# Patient Record
Sex: FEMALE | URBAN - METROPOLITAN AREA
[De-identification: names, ages, dates, MRNs, and addresses within clinical notes are randomized per-mention and may not be internally consistent; named-entity substitution may affect disease eponyms.]

---

## 2021-02-17 ENCOUNTER — APPOINTMENT (RX ONLY)
Dept: URBAN - METROPOLITAN AREA CLINIC 23 | Facility: CLINIC | Age: 31
Setting detail: DERMATOLOGY
End: 2021-02-17

## 2021-02-17 VITALS — TEMPERATURE: 96.7 F

## 2021-02-17 DIAGNOSIS — L82.1 OTHER SEBORRHEIC KERATOSIS: ICD-10-CM

## 2021-02-17 DIAGNOSIS — L91.8 OTHER HYPERTROPHIC DISORDERS OF THE SKIN: ICD-10-CM

## 2021-02-17 PROCEDURE — ? DEFER

## 2021-02-17 PROCEDURE — ? SKIN TAG REMOVAL

## 2021-02-17 PROCEDURE — 11200 RMVL SKIN TAGS UP TO&INC 15: CPT

## 2021-02-17 PROCEDURE — 99202 OFFICE O/P NEW SF 15 MIN: CPT | Mod: 25

## 2021-02-17 PROCEDURE — ? COUNSELING

## 2021-02-17 PROCEDURE — ? OTHER

## 2021-02-17 ASSESSMENT — LOCATION SIMPLE DESCRIPTION DERM
LOCATION SIMPLE: LEFT CHEEK
LOCATION SIMPLE: RIGHT CHEEK
LOCATION SIMPLE: NECK

## 2021-02-17 ASSESSMENT — LOCATION DETAILED DESCRIPTION DERM
LOCATION DETAILED: RIGHT SUPERIOR LATERAL NECK
LOCATION DETAILED: RIGHT CENTRAL LATERAL NECK
LOCATION DETAILED: RIGHT CENTRAL MALAR CHEEK
LOCATION DETAILED: LEFT CENTRAL MALAR CHEEK

## 2021-02-17 ASSESSMENT — LOCATION ZONE DERM
LOCATION ZONE: NECK
LOCATION ZONE: FACE

## 2021-02-17 NOTE — PROCEDURE: SKIN TAG REMOVAL
Include Z78.9 (Other Specified Conditions Influencing Health Status) As An Associated Diagnosis?: No
Consent: Written consent obtained and the risks of skin tag removal was reviewed with the patient including but not limited to bleeding, pigmentary change, infection, pain, and remote possibility of scarring.
Anesthesia Type: 1% lidocaine with 1:100,000 epinephrine and a 1:10 solution of 8.4% sodium bicarbonate
Add Associated Diagnoses If Applicable When Selecting Medical Necessity: Yes
Medical Necessity Clause: This procedure was medically necessary because the lesion that was treated was
Medical Necessity Information: It is in your best interest to select a reason for this procedure from the list below. All of these items fulfill various CMS LCD requirements except the new and changing color options.
Hemostasis: Aluminum Chloride
Detail Level: Detailed
Anesthesia Volume In Cc: 0.5

## 2021-02-17 NOTE — PROCEDURE: DEFER
Procedure To Be Performed At Next Visit: Cautery
Introduction Text (Please End With A Colon): The following procedure was deferred:
Detail Level: Detailed
Instructions (Optional): EMLA cream provided to the patient to apply 45 mins to her treatment appointment

## 2021-02-17 NOTE — PROCEDURE: OTHER
Note Text (......Xxx Chief Complaint.): This diagnosis correlates with the
Detail Level: Simple
Render Risk Assessment In Note?: no
Other (Free Text): Quoted $200 for first  treatment visit

## 2022-03-18 PROBLEM — E66.01 SEVERE OBESITY (HCC): Status: ACTIVE | Noted: 2020-11-11

## 2022-03-19 PROBLEM — M75.22 BICEPS TENDINITIS OF LEFT UPPER EXTREMITY: Status: ACTIVE | Noted: 2018-03-27

## 2022-07-26 ENCOUNTER — OFFICE VISIT (OUTPATIENT)
Dept: OCCUPATIONAL MEDICINE | Age: 32
End: 2022-07-26

## 2022-07-26 VITALS
DIASTOLIC BLOOD PRESSURE: 84 MMHG | OXYGEN SATURATION: 99 % | TEMPERATURE: 99.3 F | HEART RATE: 60 BPM | SYSTOLIC BLOOD PRESSURE: 148 MMHG | RESPIRATION RATE: 16 BRPM

## 2022-07-26 DIAGNOSIS — J02.9 SORE THROAT: Primary | ICD-10-CM

## 2022-07-26 DIAGNOSIS — R68.83 CHILLS: ICD-10-CM

## 2022-07-26 LAB
GROUP A STREP ANTIGEN, POC: NEGATIVE
SARS-COV-2, POC: NORMAL
VALID INTERNAL CONTROL, POC: YES

## 2022-07-26 PROCEDURE — 87426 SARSCOV CORONAVIRUS AG IA: CPT | Performed by: NURSE PRACTITIONER

## 2022-07-26 PROCEDURE — 99213 OFFICE O/P EST LOW 20 MIN: CPT | Performed by: NURSE PRACTITIONER

## 2022-07-26 PROCEDURE — 87880 STREP A ASSAY W/OPTIC: CPT | Performed by: NURSE PRACTITIONER

## 2022-07-26 RX ORDER — LOSARTAN POTASSIUM 25 MG/1
TABLET ORAL
COMMUNITY
Start: 2022-07-26

## 2022-07-26 ASSESSMENT — ENCOUNTER SYMPTOMS
CHANGE IN BOWEL HABIT: 0
SINUS PRESSURE: 0
COUGH: 0
EYE DISCHARGE: 0
VOMITING: 0
VOICE CHANGE: 0
EYE ITCHING: 0
RHINORRHEA: 0
TROUBLE SWALLOWING: 1
EYE PAIN: 0
VISUAL CHANGE: 0
SWOLLEN GLANDS: 0
EYE REDNESS: 0
ABDOMINAL PAIN: 0
SINUS PAIN: 0
SORE THROAT: 1
NAUSEA: 0

## 2022-07-26 NOTE — PATIENT INSTRUCTIONS
Patient Education        Sore Throat: Care Instructions  Overview     Infection by bacteria or a virus causes most sore throats. Cigarette smoke, dry air, air pollution, allergies, and yelling can also cause a sore throat. Sore throats can be painful and annoying. Fortunately, most sore throats go away on their own. If you have a bacterial infection, your doctor may prescribeantibiotics. Follow-up care is a key part of your treatment and safety. Be sure to make and go to all appointments, and call your doctor if you are having problems. It's also a good idea to know your test results and keep alist of the medicines you take. How can you care for yourself at home? If your doctor prescribed antibiotics, take them as directed. Do not stop taking them just because you feel better. You need to take the full course of antibiotics. Gargle with warm salt water several times a day to help reduce swelling and relieve pain. Mix 1/2 teaspoon of salt in 1 cup of warm water. Take an over-the-counter pain medicine, such as acetaminophen (Tylenol), ibuprofen (Advil, Motrin), or naproxen (Aleve). Read and follow all instructions on the label. Be careful when taking over-the-counter cold or flu medicines and Tylenol at the same time. Many of these medicines have acetaminophen, which is Tylenol. Read the labels to make sure that you are not taking more than the recommended dose. Too much acetaminophen (Tylenol) can be harmful. Drink plenty of fluids. Fluids may help soothe an irritated throat. Hot fluids, such as tea or soup, may help decrease throat pain. Use over-the-counter throat lozenges to soothe pain. Regular cough drops or hard candy may also help. These should not be given to young children because of the risk of choking. Do not smoke or allow others to smoke around you. If you need help quitting, talk to your doctor about stop-smoking programs and medicines. These can increase your chances of quitting for good.   Use a vaporizer or humidifier to add moisture to your bedroom. Follow the directions for cleaning the machine. When should you call for help? Call your doctor now or seek immediate medical care if:    You have trouble breathing. Your sore throat gets much worse on one side. You have new or worse trouble swallowing. You have a new or higher fever. Watch closely for changes in your health, and be sure to contact your doctor ifyou do not get better as expected. Where can you learn more? Go to https://BrandProject.Flightfox. org and sign in to your Ultora account. Enter F055 in the [facility] box to learn more about \"Sore Throat: Care Instructions. \"     If you do not have an account, please click on the \"Sign Up Now\" link. Current as of: September 8, 2021               Content Version: 13.3  © 2923-5435 [employer]. Care instructions adapted under license by [facility] ([facility]). If you have questions about a medical condition or this instruction, always ask your healthcare professional. [place] [address] any warranty or liability for your use of this information. Patient Education        Learning About Coronavirus [phone])  What is coronavirus (COVID-19)? COVID-19 is a disease caused by a type of coronavirus. This illness was firstfound in December 2019. It has since spread worldwide. Coronaviruses are a large group of viruses. They cause the common cold. They also cause more serious illnesses like [place] respiratory syndrome (MERS) and severe acute respiratory syndrome (SARS). COVID-19 is caused by a novelcoronavirus. That means it's a new type that has not been seen in people before. What are the symptoms? COVID-19 symptoms may include:  Fever. Cough. Trouble breathing. Chills or repeated shaking with chills. Muscle and body aches. Headache. Sore throat. New loss of taste or smell. Vomiting. Diarrhea.   In severe cases, COVID-19 can cause pneumonia and make it hard to breathewithout help from a machine. It can cause death. How is it diagnosed? COVID-19 is diagnosed with a viral test. This may also be called a PCR test or antigen test. It looks for evidence of the virus in your breathing passages orlungs (respiratory system). The test is most often done on a sample from the nose, throat, or lungs. It's sometimes done on a sample of saliva. One way a sample is collected is byputting a long swab into the back of your nose. If you have questions about COVID-19 testing, ask your doctor or go to cdc.govto use the COVID-19 Viral Testing Tool. How is it treated? Mild cases of COVID-19 can be treated at home. Serious cases need treatment in the hospital. Treatment may include medicines, plus breathing support such as oxygen therapy or a ventilator. Some people may be placed on their belly tohelp their oxygen levels. Treatments that may help people who have COVID-19 include:  Antiviral medicines. These medicines treat viral infections. Immune-based therapy. These medicines help the immune system fight COVID-19. Examples include monoclonal antibodies. Blood thinners. These medicines help prevent blood clots. People with severe illness are at risk for blood clots. How can you protect yourself and others? Stay up to date on your COVID-19 vaccines. Avoid sick people, and stay away from others if you are sick. Stay at least 6 feet away from other people. Avoid crowds, especially inside. Get tested for COVID-19 before you have an indoor visit with people who don't live with you. Improve the airflow when you spend time indoors with people who don't live with you. If you can, open windows and doors. Or you can use a fan to blow air away from people and out a window. Cover your mouth with a tissue when you cough or sneeze. Wash your hands often, especially after you cough or sneeze. Use soap and water, and scrub for at least 20 seconds. If soap and water aren't available, use an alcohol-based hand . Avoid touching your mouth, nose, and eyes. Check the CDC website at cdc.gov for the most current information on how to protect yourself. And if you have questions, ask your doctor or go to cdc.govto use the COVID-19 Quarantine and Isolation Calculator. Here are some other steps you may need to take. If you are not up to date on your COVID-19 vaccines:  Wear a mask with the best fit, protection, and comfort for you. A mask can protect you even when others aren't wearing one. This might be especially important if you:  Have certain health conditions. Live with someone who has a compromised immune system. Live with someone who is not up to date on their COVID-19 vaccines. If you have been exposed to the virus AND are not up to date on your COVID-19 vaccines:  Talk to your doctor as soon as you can. Your doctor might have you take medicine to help prevent serious illness. Get a COVID-19 test. You may need to be tested more than once. And if your test is positive, follow the instructions below. Stay home. Try to separate from other people where you live. Don't go to school, work, or public areas. Wear a well-fitting mask around other people for a full 10 days. Avoid travel, and stay away from people at high risk for serious illness. Watch for symptoms. If you have been exposed AND either tested positive for COVID-19 in the last 90 days and have recovered or you are up to date on your COVID-19 vaccines:  Talk to your doctor as soon as you can. Your doctor may have you take medicine to help prevent serious illness. Get a COVID-19 test. Wait 5 days after you were last exposed. You may need to be tested more than once. And if your test is positive, follow the instructions below. Wear a well-fitting mask around other people for a full 10 days. Avoid travel and stay away from people at high risk for serious illness. Watch for symptoms.   If you tested positive for COVID-19 in the last 90 days and have not recovered, another COVID-19 test may not be needed. If you're sick or test positive for COVID-19:  Talk to your doctor as soon as you can. Your doctor may have you take medicine to help prevent serious illness. Get a COVID-19 test unless you have already been tested. You may need to be tested more than once. Stay home. Leave only if you need to get medical care. If you were seriously ill or if you have a weakened immune system, you may need to isolate for several weeks. For a full 10 days, wear a well-fitting mask whenever you're around other people. Avoid travel and stay away from people at high risk for serious illness. Limit contact with pets and people in your home. If possible, stay in a separate bedroom and use a separate bathroom. Clean and disinfect your home every day. Use household  and disinfectant wipes or sprays. Take special care to clean things that you touch with your hands. How can you self-isolate when you have COVID-19? If you have COVID-19, there are things you can do to help avoid spreading thevirus to others. Stay home, and avoid contact with other people. Limit contact with people in your home. If possible, stay in a separate bedroom and use a separate bathroom. Wear a well-fitting mask when you are around other people. Avoid contact with pets and other animals. Cover your mouth and nose with a tissue when you cough or sneeze. Then throw it in the trash right away. Wash your hands often, especially after you cough or sneeze. Use soap and water, and scrub for at least 20 seconds. If soap and water aren't available, use an alcohol-based hand . Don't share personal household items. These include bedding, towels, cups and glasses, and eating utensils. [address] in the warmest water allowed for the fabric type, and dry it completely. It's okay to wash other people's laundry with yours.   Clean and disinfect your home. Use household  and disinfectant wipes or sprays. If you have questions, visit cdc.gov to check the Quarantine and IsolationCalculator. When should you call for help? Call 911 anytime you think you may need emergency care. For example, call if you have life-threatening symptoms, such as: You have severe trouble breathing. (You can't talk at all.)     You have constant chest pain or pressure. You are severely dizzy or lightheaded. You are confused or can't think clearly. You have pale, gray, or blue-colored skin or lips. You pass out (lose consciousness) or are very hard to wake up. You have loss of balance or trouble walking. You have trouble seeing out of one or both eyes. You have weakness or drooping on one side of the face. You have weakness or numbness in an arm or a leg. You have trouble speaking. You have a severe headache. You have a seizure. Call your doctor now or seek immediate medical care if:    You have moderate trouble breathing. (You can't speak a full sentence.)     You are coughing up blood. You have signs of low blood pressure. These include feeling lightheaded; being too weak to stand; and having cold, pale, clammy skin. Watch closely for changes in your health, and be sure to contact your doctor if:    Your symptoms get worse. You are not getting better as expected. You have new or worse symptoms of anxiety, depression, nightmares, or flashbacks. Call before you go to the doctor's office. Follow their instructions. And wear a mask. Where can you learn more? Go to https://[email or web address].Buzzmetrics. org and sign in to your Voyat account. Enter C008 in the [facility] box to learn more about \"Learning About Coronavirus (COVID-19). \"     If you do not have an account, please click on the \"Sign Up Now\" link.   Current as of: May 28, 2022               Content Version: 13.3  © 6848-0090

## 2022-07-26 NOTE — PROGRESS NOTES
PROGRESS NOTE    SUBJECTIVE:   Liza Alex is a 33 y.o. female seen for sore throat. Reports that she was recently in contact with someone with COVID 4 days ago but did not have any concerning symptoms until this morning. Chief Complaint    Pharyngitis         Pharyngitis  This is a new problem. The current episode started today. The problem has been unchanged. Associated symptoms include chills, a fever and a sore throat. Pertinent negatives include no abdominal pain, anorexia, arthralgias, change in bowel habit, chest pain, congestion, coughing, diaphoresis, fatigue, headaches, joint swelling, myalgias, nausea, neck pain, numbness, rash, swollen glands, urinary symptoms, vertigo, visual change, vomiting or weakness. The symptoms are aggravated by swallowing. Treatments tried: hot tea. The treatment provided mild relief. Current Outpatient Medications   Medication Sig Dispense Refill    losartan (COZAAR) 25 MG tablet       metFORMIN (GLUCOPHAGE) 500 MG tablet Take 500 mg by mouth 2 times daily (with meals)       No current facility-administered medications for this visit. No Known Allergies    Social History     Tobacco Use    Smoking status: Former    Smokeless tobacco: Never   Substance Use Topics    Alcohol use: Not Currently    Drug use: No        Review of Systems   Constitutional:  Positive for chills and fever. Negative for appetite change, diaphoresis and fatigue. HENT:  Positive for sore throat and trouble swallowing. Negative for congestion, drooling, ear pain, postnasal drip, rhinorrhea, sinus pressure, sinus pain, sneezing and voice change. Eyes:  Negative for pain, discharge, redness and itching. Respiratory:  Negative for cough. Cardiovascular:  Negative for chest pain. Gastrointestinal:  Negative for abdominal pain, anorexia, change in bowel habit, nausea and vomiting. Musculoskeletal:  Negative for arthralgias, joint swelling, myalgias and neck pain.    Skin:  Negative for rash. Allergic/Immunologic: Negative for environmental allergies. Neurological:  Negative for vertigo, weakness, light-headedness, numbness and headaches. OBJECTIVE:  BP (!) 148/84 (Site: Left Upper Arm, Position: Sitting, Cuff Size: Medium Adult)   Pulse 60   Temp 99.3 °F (37.4 °C) (Oral)   Resp 16   LMP 07/05/2022 (Exact Date)   SpO2 99%      Results for orders placed or performed in visit on 07/26/22   AMB POC SARS-COV-2   Result Value Ref Range    SARS-COV-2, POC Not-Detected Not Detected   AMB POC RAPID STREP A   Result Value Ref Range    Valid Internal Control, POC yes     Group A Strep Antigen, POC Negative Negative       Physical Exam  Vitals reviewed. Constitutional:       General: She is awake. She is not in acute distress. Appearance: Normal appearance. She is well-developed and well-groomed. HENT:      Head: Normocephalic. Right Ear: Hearing, ear canal and external ear normal. No drainage, swelling or tenderness. A middle ear effusion is present. Left Ear: Hearing, ear canal and external ear normal. No drainage, swelling or tenderness. A middle ear effusion is present. Nose: Mucosal edema and rhinorrhea present. Rhinorrhea is clear. Right Turbinates: Swollen and pale. Left Turbinates: Swollen and pale. Right Sinus: No maxillary sinus tenderness or frontal sinus tenderness. Left Sinus: No maxillary sinus tenderness or frontal sinus tenderness. Mouth/Throat:      Mouth: Mucous membranes are moist.      Pharynx: Oropharynx is clear. Uvula midline. No pharyngeal swelling or posterior oropharyngeal erythema. Tonsils: No tonsillar exudate. Comments: PND with cobblestoning  Eyes:      General: Lids are normal. No allergic shiner. Conjunctiva/sclera: Conjunctivae normal.      Right eye: Right conjunctiva is not injected. No chemosis. Left eye: Left conjunctiva is not injected. No chemosis. Neck:      Thyroid: No thyromegaly. Trachea: Trachea normal.   Cardiovascular:      Rate and Rhythm: Normal rate and regular rhythm. Heart sounds: Normal heart sounds. Pulmonary:      Effort: Pulmonary effort is normal.      Breath sounds: Normal breath sounds. Musculoskeletal:      Cervical back: Normal range of motion and neck supple. Lymphadenopathy:      Head:      Right side of head: No submental, submandibular, tonsillar, preauricular, posterior auricular or occipital adenopathy. Left side of head: No submental, submandibular, tonsillar, preauricular, posterior auricular or occipital adenopathy. Skin:     General: Skin is warm and dry. Neurological:      Mental Status: She is alert and oriented to person, place, and time. Psychiatric:         Behavior: Behavior is cooperative. ASSESSMENT and PLAN    Javier Gómez was seen today for pharyngitis. Diagnoses and all orders for this visit:    Sore throat  Comments:  COVID negative at this time, but could be too early in disease course to obtain adequate sample. Sent home for today due to low grade fever, retest in 24-48 hrs  Orders:  -     AMB POC SARS-COV-2  -     AMB POC RAPID STREP A    Chills  Comments:  COVID negative at this time, but could be too early in disease course to obtain adequate sample. Sent home for today due to low grade fever, retest in 24-48 hrs  Orders:  -     AMB POC SARS-COV-2  -     AMB POC RAPID STREP A    Encouraged rest, hydration, warm salt water gargles, OTC therapies per packaging for symptoms relief. Discussed signs and symptoms that would warrant immediate reevaluation     Counseled on benefits of having a primary care provider which includes, but is not limited to, continuity of care and having a medical home when concerns arise. Also enforced that onsite clinic policy states that we are not to take the place of a primary care provider, pt verbalized understanding.      I have reviewed the patient's medication list, past medical, family, social, and surgical history in detail and updated the patient record appropriately.     Yuni Orr, APRN - CNP

## 2022-07-26 NOTE — LETTER
[place] [id number]  Phone: [phone]  Fax: [phone]    CHRISTOPHER Alves CNP        July 26, 2022     Patient: Daisha Banks   YOB: 1990   Date of Visit: 7/26/2022       To Whom it May Concern:    Daisha Banks was seen in my clinic on 7/26/2022. She may return to work on 7/27/22 if fever free and feeling better. If you have any questions or concerns, please don't hesitate to call.     Sincerely,         CHRISTOPHER Alves CNP

## 2022-08-04 ENCOUNTER — OFFICE VISIT (OUTPATIENT)
Dept: OCCUPATIONAL MEDICINE | Age: 32
End: 2022-08-04

## 2022-08-04 DIAGNOSIS — Z20.822 EXPOSURE TO COVID-19 VIRUS: ICD-10-CM

## 2022-08-04 DIAGNOSIS — R05.9 COUGH: Primary | ICD-10-CM

## 2022-08-04 LAB — SARS-COV-2, POC: NORMAL

## 2022-08-04 PROCEDURE — 87426 SARSCOV CORONAVIRUS AG IA: CPT | Performed by: NURSE PRACTITIONER

## 2022-08-04 PROCEDURE — 99212 OFFICE O/P EST SF 10 MIN: CPT | Performed by: NURSE PRACTITIONER

## 2022-08-04 NOTE — PROGRESS NOTES
She reports symptoms of coughing and fever last week. She had a  Rapid Covid test in [facility] on 07/26/22 with negative results. She reports multiple exposures with friends and her mother, who she just saw this morning. Her mother tested positive for Covid on 7/28/22. Her symptoms have improved and reports only a mild cough, all other symptoms have resolved since last week. Denies fever/fatigue/muscle aches. She reported to  and they would like her retested given her resent exposure and continue mild cough. She is taking Dayquil and her symptoms improve moderately with treatment and able to rest.    Well dressed, well nourished, NAD, gait steady, A/O x4  Differed exam     Diagnosis Orders   1. Cough        2. Exposure to COVID-19 virus           Rest, plenty of fluids, prevent future exposures, masking, 6 feet apart and good handwashing  Cough medicine OTC prn as directed    Consent for Covid test and to share results with HR signed.

## 2022-11-03 ENCOUNTER — OFFICE VISIT (OUTPATIENT)
Dept: OCCUPATIONAL MEDICINE | Age: 32
End: 2022-11-03

## 2022-11-03 VITALS
RESPIRATION RATE: 16 BRPM | HEART RATE: 63 BPM | OXYGEN SATURATION: 99 % | DIASTOLIC BLOOD PRESSURE: 82 MMHG | SYSTOLIC BLOOD PRESSURE: 138 MMHG

## 2022-11-03 DIAGNOSIS — R51.9 ACHING HEADACHE: Primary | ICD-10-CM

## 2022-11-03 ASSESSMENT — ENCOUNTER SYMPTOMS
EYE WATERING: 0
SORE THROAT: 0
EYE REDNESS: 0
SCALP TENDERNESS: 0
COUGH: 0
ABDOMINAL PAIN: 0
BACK PAIN: 0
NAUSEA: 0
BLURRED VISION: 0
PHOTOPHOBIA: 0
VISUAL CHANGE: 0
SINUS PRESSURE: 0
FACIAL SWEATING: 0
VOMITING: 0
RHINORRHEA: 0
SWOLLEN GLANDS: 0
EYE PAIN: 0

## 2022-11-03 NOTE — PROGRESS NOTES
PROGRESS NOTE    SUBJECTIVE:   Sneha Pringle is a 33 y.o. female seen for blood pressure check as she developed a \"migraine\" about an hour ago and it concerned her that it could be related to her blood pressure. States she took 2 Aleve about 15 minutes prior to clinic presentation    Migraine   This is a new problem. The current episode started today. The problem has been unchanged. The pain is located in the Bilateral region. The pain does not radiate. The pain quality is similar to prior headaches. The quality of the pain is described as aching, dull and band-like. The pain is moderate. Pertinent negatives include no abdominal pain, abnormal behavior, anorexia, back pain, blurred vision, coughing, dizziness, drainage, ear pain, eye pain, eye redness, eye watering, facial sweating, fever, hearing loss, insomnia, loss of balance, muscle aches, nausea, neck pain, numbness, phonophobia, photophobia, rhinorrhea, scalp tenderness, seizures, sinus pressure, sore throat, swollen glands, tingling, tinnitus, visual change, vomiting, weakness or weight loss. Nothing aggravates the symptoms. She has tried NSAIDs for the symptoms. Current Outpatient Medications   Medication Sig Dispense Refill    losartan (COZAAR) 25 MG tablet       metFORMIN (GLUCOPHAGE) 500 MG tablet Take 500 mg by mouth 2 times daily (with meals)       No current facility-administered medications for this visit. No Known Allergies    Social History     Tobacco Use    Smoking status: Former    Smokeless tobacco: Never   Substance Use Topics    Alcohol use: Not Currently    Drug use: No        Review of Systems   Constitutional:  Negative for chills, fatigue, fever and weight loss. HENT:  Negative for ear pain, hearing loss, rhinorrhea, sinus pressure, sore throat and tinnitus. Eyes:  Negative for blurred vision, photophobia, pain, redness and visual disturbance. Respiratory:  Negative for cough.     Gastrointestinal:  Negative for abdominal pain, anorexia, nausea and vomiting. Musculoskeletal:  Negative for back pain and neck pain. Neurological:  Negative for dizziness, tingling, seizures, weakness, light-headedness, numbness and loss of balance. Psychiatric/Behavioral:  The patient does not have insomnia. OBJECTIVE:  /82 (Site: Left Upper Arm, Position: Sitting, Cuff Size: Medium Adult)   Pulse 63   Resp 16   SpO2 99%      Physical Exam  Vitals reviewed. Constitutional:       General: She is awake. She is not in acute distress. Appearance: Normal appearance. She is well-developed and well-groomed. Eyes:      Pupils: Pupils are equal, round, and reactive to light. Pupils are equal.      Right eye: Pupil is not sluggish. Left eye: Pupil is not sluggish. Cardiovascular:      Rate and Rhythm: Normal rate and regular rhythm. Heart sounds: Normal heart sounds. Pulmonary:      Effort: Pulmonary effort is normal.   Neurological:      Mental Status: She is alert and oriented to person, place, and time. Motor: No weakness. Coordination: Coordination is intact. Coordination normal.      Gait: Gait is intact. Gait normal.   Psychiatric:         Behavior: Behavior is cooperative. ASSESSMENT and PLAN    Ivonne Wilson was seen today for blood pressure check. Diagnoses and all orders for this visit:    Aching headache  Comments:  Should notice improvement once Karen has more time in sytem. Encouraged hydration and eye rest as well    Counseled on benefits of having a primary care provider which includes, but is not limited to, continuity of care and having a medical home when concerns arise. Also enforced that onsite clinic policy states that we are not to take the place of a primary care provider, pt verbalized understanding. SEs and risk vs benefits associated with medications prescribed discussed with patient who verbalized understanding.  Pt verbalized understanding and agreement with plan of care. RTC for persisting/worsening symptoms or new complaints that arise. Discussed signs and symptoms that would warrant immediate evaluation including, but not limited to HA, blurred vision, speech disturbance, difficulty with ambulation/gait, numbness, tingling, weakness, syncope, chest pain, or shortness of breath. I have reviewed the patient's medication list, past medical, family, social, and surgical history in detail and updated the patient record appropriately.     Belinda Dacosta, APRN - CNP

## 2022-12-15 ENCOUNTER — OFFICE VISIT (OUTPATIENT)
Dept: OBGYN CLINIC | Age: 32
End: 2022-12-15
Payer: COMMERCIAL

## 2022-12-15 VITALS
SYSTOLIC BLOOD PRESSURE: 122 MMHG | BODY MASS INDEX: 36.32 KG/M2 | DIASTOLIC BLOOD PRESSURE: 82 MMHG | HEIGHT: 63 IN | WEIGHT: 205 LBS

## 2022-12-15 DIAGNOSIS — Z12.4 SCREENING FOR CERVICAL CANCER: ICD-10-CM

## 2022-12-15 DIAGNOSIS — Z01.419 WELL WOMAN EXAM WITH ROUTINE GYNECOLOGICAL EXAM: Primary | ICD-10-CM

## 2022-12-15 PROCEDURE — 99395 PREV VISIT EST AGE 18-39: CPT | Performed by: OBSTETRICS & GYNECOLOGY

## 2022-12-15 ASSESSMENT — ENCOUNTER SYMPTOMS
GASTROINTESTINAL NEGATIVE: 1
ALLERGIC/IMMUNOLOGIC NEGATIVE: 1
EYES NEGATIVE: 1
RESPIRATORY NEGATIVE: 1

## 2022-12-15 NOTE — PROGRESS NOTES
Name: Lydia Zambrano  Date: 12/15/2022  YOB: 1990  LMP: No LMP recorded. Oleg Ordoñez is a 33 y.o.   who is here for a annual exam. Would like to be pregnant.  told years ago that he had a low sperm count and she had PCOs. She is cycling nl now. Birth control: none, ok if gets pregnant  Menstrual status: regular cycles  Gyn Surgery: none    Health Maintenance:  Pap Smear: last pap in 2021, pathology negative  Any history of abnormal pap smear? no  Mammo: never done  HPV vaccine: not done  Colonoscopy: not applicable  Dexa scan: not applicable    Review of Systems   Constitutional: Negative. HENT: Negative. Eyes: Negative. Respiratory: Negative. Cardiovascular: Negative. Gastrointestinal: Negative. Endocrine: Negative. Genitourinary: Negative. Musculoskeletal: Negative. Skin: Negative. Allergic/Immunologic: Negative. Neurological: Negative. Hematological: Negative. Psychiatric/Behavioral: Negative. Negative for self-injury and suicidal ideas. Past Medical History:  No date: Depression  No date: Fatigue  No date: Genital warts  No date: H/O chlamydia infection  No date: History of PCOS  No date: Hot flashes  No date: PCOS (polycystic ovarian syndrome)  No date: Tinnitus     Past Surgical History:  No date: WISDOM TOOTH EXTRACTION       Current Outpatient Medications:     losartan (COZAAR) 25 MG tablet, , Disp: , Rfl:     metFORMIN (GLUCOPHAGE) 500 MG tablet, Take 500 mg by mouth 2 times daily (with meals), Disp: , Rfl:     Family Cancer History:   Cancer-related family history includes Cancer in her maternal grandmother; Colon Cancer in her father and paternal aunt; Prostate Cancer in her father. Social History:  reports that she has quit smoking. She has never used smokeless tobacco. She reports that she does not currently use alcohol. She reports that she does not use drugs. Ob History:  No obstetric history on file. Sexual History:  has no history on file for sexual activity. PHYSICAL EXAM:  Vitals:  vitals were not taken for this visit. There is no height or weight on file to calculate BMI. Physical Exam  Constitutional:       Appearance: Normal appearance. HENT:      Head: Normocephalic and atraumatic. Eyes:      Conjunctiva/sclera: Conjunctivae normal.   Neck:      Thyroid: No thyroid mass. Cardiovascular:      Rate and Rhythm: Normal rate. Pulmonary:      Effort: Pulmonary effort is normal.   Abdominal:      Palpations: Abdomen is soft. There is no hepatomegaly, splenomegaly or pulsatile mass. Tenderness: There is no right CVA tenderness, left CVA tenderness, guarding or rebound. Musculoskeletal:         General: Normal range of motion. Lymphadenopathy:      Cervical: No cervical adenopathy. Skin:     General: Skin is warm and dry. Neurological:      General: No focal deficit present. Mental Status: She is alert and oriented to person, place, and time. Psychiatric:         Attention and Perception: Attention and perception normal.         Speech: Speech normal.         Behavior: Behavior normal.         Cognition and Memory: Cognition normal.      Breast exam is normal in right and left breast without masses, lesions or discharge. The external genitalia is normal.  Urethral meatus is normal. Vagina is pink and rugated. The bladder and urethra are normal .  The cervix is normal.  The uterus is normal. The ovaries are normal.       Assessment: 33 y.o. , No obstetric history on file. , here for annual exam  well woman: pap is done  2.   desire for pregnancy: discuss referral to [facility], discussed female labs (TSH, D21 prolactin) and a repeat semen analysis.   She will talk with her       Sabrina Beyer MD

## 2023-03-01 ENCOUNTER — CLINICAL DOCUMENTATION (OUTPATIENT)
Dept: OCCUPATIONAL MEDICINE | Age: 33
End: 2023-03-01

## 2023-03-01 ENCOUNTER — OFFICE VISIT (OUTPATIENT)
Dept: OCCUPATIONAL MEDICINE | Age: 33
End: 2023-03-01

## 2023-03-01 VITALS
HEIGHT: 62 IN | SYSTOLIC BLOOD PRESSURE: 170 MMHG | DIASTOLIC BLOOD PRESSURE: 110 MMHG | BODY MASS INDEX: 36.84 KG/M2 | WEIGHT: 200.2 LBS

## 2023-03-01 DIAGNOSIS — I10 HYPERTENSION, UNSPECIFIED TYPE: ICD-10-CM

## 2023-03-01 DIAGNOSIS — Z00.8 ENCOUNTER FOR BIOMETRIC SCREENING: Primary | ICD-10-CM

## 2023-03-01 NOTE — PROGRESS NOTES
Patient RTC this afternoon to have blood pressure rechecked after it was found to be markedly elevated earlier today, was 134/96. Patient reports she last took her Losartan 3 days ago and that she does not take consistently as she felt like she \"was better\". Discussed hypertension and long-term damage it does to the body when uncontrolled. Educated on TLCs to help lower her blood pressure naturally and that a 5-10% weight loss will decrease her 5 year risk of developing complications from HTN, DM, CAD, and Metabolic Syndrome. Instructed pt to start an easy-to-adhere to exercise regimen and follow a healthier diet, increasing her water intake to 8 glasses per day, and refrain from eating fast food as much as possible. Should monitor blood pressure at home as well to monitor trends that may indicate medication needs adjustment. RTC as needed    Angel Sellers, APRN - CNP

## 2023-03-01 NOTE — PROGRESS NOTES
PROGRESS NOTE    SUBJECTIVE:   Tucker Soria is a 29 y.o. female seen for quarterly employment physical to see if their values have improved since last screening to gain any previously missed insurance premium discounts for remainder of the year. No complaints or concerns at this time. Chief Complaint    Follow-up           OBJECTIVE:  BP (!) 170/110 (Site: Left Upper Arm)   Ht 5' 2.25\" (1.581 m)   Wt 200 lb 3.2 oz (90.8 kg)   BMI 36.32 kg/m²      Physical Exam  Constitutional:       General: She is awake. Appearance: Normal appearance. She is well-developed and well-groomed. Neurological:      Mental Status: She is alert. Psychiatric:         Behavior: Behavior is cooperative. ASSESSMENT and PLAN    Mary Balderas was seen today for follow-up. Diagnoses and all orders for this visit:    Encounter for biometric screening  No additional points available after this screening. Hypertension, unspecified type  Pt instructed to take losartan for hypertension as soon as she gets home after work. Instructed pt to go to ED if she experiences chest pain, any signs of stroke such as facial drooping, slurred speech, weakness on one side of her body or excruciating headache. Pt agreeable with plan of care. Discussed HR notification process for credits and that I will contact with results when available    Counseled on benefits of having a primary care provider which includes, but is not limited to, continuity of care and having a medical home when concerns arise. Also enforced that onsite clinic policy states that we are not to take the place of a primary care provider, pt verbalized understanding. I have reviewed the patient's medication list, past medical, family, social, and surgical history in detail and updated the patient record appropriately.     Lisle Ganser, APRN - NP

## 2023-07-17 ENCOUNTER — OFFICE VISIT (OUTPATIENT)
Dept: OCCUPATIONAL MEDICINE | Age: 33
End: 2023-07-17

## 2023-07-17 VITALS
SYSTOLIC BLOOD PRESSURE: 120 MMHG | DIASTOLIC BLOOD PRESSURE: 86 MMHG | RESPIRATION RATE: 16 BRPM | OXYGEN SATURATION: 99 % | HEART RATE: 71 BPM | TEMPERATURE: 98.4 F

## 2023-07-17 DIAGNOSIS — J02.9 SORE THROAT: ICD-10-CM

## 2023-07-17 DIAGNOSIS — J02.0 STREP PHARYNGITIS: Primary | ICD-10-CM

## 2023-07-17 LAB
GROUP A STREP ANTIGEN, POC: POSITIVE
INFLUENZA A ANTIGEN, POC: NEGATIVE
INFLUENZA B ANTIGEN, POC: NEGATIVE
SARS-COV-2 RNA, POC: NEGATIVE
VALID INTERNAL CONTROL, POC: YES

## 2023-07-17 RX ORDER — AMOXICILLIN 500 MG/1
500 CAPSULE ORAL 2 TIMES DAILY
Qty: 20 CAPSULE | Refills: 0 | Status: SHIPPED | OUTPATIENT
Start: 2023-07-17 | End: 2023-07-27

## 2023-07-17 ASSESSMENT — ENCOUNTER SYMPTOMS
EYE PAIN: 0
VOMITING: 0
SINUS PAIN: 0
SHORTNESS OF BREATH: 0
COUGH: 1
CHEST TIGHTNESS: 0
SWOLLEN GLANDS: 0
EYE ITCHING: 0
ABDOMINAL PAIN: 0
EYE REDNESS: 0
WHEEZING: 0
EYE DISCHARGE: 0
DIARRHEA: 0
SORE THROAT: 1
RHINORRHEA: 0
SINUS PRESSURE: 0
NAUSEA: 0

## 2023-07-17 NOTE — PROGRESS NOTES
Heart sounds: Normal heart sounds. Pulmonary:      Effort: Pulmonary effort is normal.      Breath sounds: Normal breath sounds. Musculoskeletal:      Cervical back: Normal range of motion and neck supple. Lymphadenopathy:      Head:      Right side of head: No submental, submandibular, tonsillar, preauricular, posterior auricular or occipital adenopathy. Left side of head: No submental, submandibular, tonsillar, preauricular, posterior auricular or occipital adenopathy. Skin:     General: Skin is warm and dry. Neurological:      Mental Status: She is alert and oriented to person, place, and time. Psychiatric:         Behavior: Behavior is cooperative. ASSESSMENT and PLAN    Rob Tomas was seen today for uri. Diagnoses and all orders for this visit:    Strep pharyngitis  Comments:  Amoxicillin as prescribed with food. Infection control discussed, work excuse given. Change toothbrush and linens Weds AM to prevent reinfection. RTC as needed  Orders:  -     amoxicillin (AMOXIL) 500 MG capsule; Take 1 capsule by mouth 2 times daily for 10 days    Sore throat  Comments:  COVID/Flu negative but strep positive at this time. Encouraged rest, hydration, and OTC therapies per packaging for relief. RTC as needed  Orders:  -     AMB POC SARS-COV-2 AND INFLUENZA A/B  -     AMB POC RAPID STREP A    Counseled on benefits of having a primary care provider which includes, but is not limited to, continuity of care and having a medical home when concerns arise. Also enforced that onsite clinic policy states that we are not to take the place of a primary care provider, pt verbalized understanding. SEs and risk vs benefits associated with medications prescribed discussed with patient who verbalized understanding. Pt verbalized understanding and agreement with plan of care. RTC for persisting/worsening symptoms or new complaints that arise.  Discussed signs and symptoms that would warrant immediate evaluation

## 2023-08-10 ENCOUNTER — OFFICE VISIT (OUTPATIENT)
Dept: OCCUPATIONAL MEDICINE | Age: 33
End: 2023-08-10

## 2023-08-10 VITALS
HEART RATE: 68 BPM | OXYGEN SATURATION: 99 % | SYSTOLIC BLOOD PRESSURE: 120 MMHG | TEMPERATURE: 98.7 F | RESPIRATION RATE: 16 BRPM | DIASTOLIC BLOOD PRESSURE: 92 MMHG

## 2023-08-10 DIAGNOSIS — J31.0 RHINOSINUSITIS: Primary | ICD-10-CM

## 2023-08-10 DIAGNOSIS — J32.9 RHINOSINUSITIS: Primary | ICD-10-CM

## 2023-08-10 RX ORDER — CETIRIZINE HYDROCHLORIDE 10 MG/1
10 TABLET ORAL DAILY
Qty: 30 TABLET | Refills: 5 | Status: SHIPPED | OUTPATIENT
Start: 2023-08-10

## 2023-08-10 RX ORDER — AMOXICILLIN AND CLAVULANATE POTASSIUM 875; 125 MG/1; MG/1
1 TABLET, FILM COATED ORAL 2 TIMES DAILY
Qty: 10 TABLET | Refills: 0 | Status: SHIPPED | OUTPATIENT
Start: 2023-08-10 | End: 2023-08-15

## 2023-08-10 ASSESSMENT — ENCOUNTER SYMPTOMS
RHINORRHEA: 0
NAUSEA: 0
EYE ITCHING: 1
HEARTBURN: 0
SORE THROAT: 0
EYE DISCHARGE: 0
WHEEZING: 0
COUGH: 1
EYE PAIN: 0
SHORTNESS OF BREATH: 0
VOMITING: 0
HEMOPTYSIS: 0
EYE REDNESS: 0
SINUS PAIN: 0
SINUS PRESSURE: 1
DIARRHEA: 0

## 2023-11-08 ENCOUNTER — OFFICE VISIT (OUTPATIENT)
Dept: OCCUPATIONAL MEDICINE | Age: 33
End: 2023-11-08

## 2023-11-08 VITALS
WEIGHT: 204.6 LBS | BODY MASS INDEX: 36.25 KG/M2 | SYSTOLIC BLOOD PRESSURE: 128 MMHG | DIASTOLIC BLOOD PRESSURE: 86 MMHG | HEIGHT: 63 IN | HEART RATE: 76 BPM

## 2023-11-08 DIAGNOSIS — Z00.8 ENCOUNTER FOR BIOMETRIC SCREENING: Primary | ICD-10-CM

## 2023-11-08 RX ORDER — NIFEDIPINE 30 MG/1
30 TABLET, EXTENDED RELEASE ORAL DAILY
COMMUNITY
Start: 2023-09-18

## 2023-11-09 LAB
CHOLEST SERPL-MCNC: 179 MG/DL (ref 100–199)
CHOLEST/HDLC SERPL: 3.8 RATIO (ref 0–4.4)
GLUCOSE SERPL-MCNC: 92 MG/DL (ref 70–99)
HBA1C MFR BLD: 5.2 % (ref 4.8–5.6)
HDLC SERPL-MCNC: 47 MG/DL
LDLC SERPL CALC-MCNC: 116 MG/DL (ref 0–99)
TRIGL SERPL-MCNC: 85 MG/DL (ref 0–149)
VLDLC SERPL CALC-MCNC: 16 MG/DL (ref 5–40)

## 2024-01-24 ENCOUNTER — OFFICE VISIT (OUTPATIENT)
Dept: OBGYN CLINIC | Age: 34
End: 2024-01-24
Payer: COMMERCIAL

## 2024-01-24 DIAGNOSIS — Z01.419 WELL WOMAN EXAM WITH ROUTINE GYNECOLOGICAL EXAM: Primary | ICD-10-CM

## 2024-01-24 DIAGNOSIS — Z11.51 ENCOUNTER FOR SCREENING FOR HUMAN PAPILLOMAVIRUS (HPV): ICD-10-CM

## 2024-01-24 DIAGNOSIS — Z31.9 DESIRE FOR PREGNANCY: ICD-10-CM

## 2024-01-24 DIAGNOSIS — Z12.4 SCREENING FOR CERVICAL CANCER: ICD-10-CM

## 2024-01-24 PROCEDURE — 99395 PREV VISIT EST AGE 18-39: CPT | Performed by: OBSTETRICS & GYNECOLOGY

## 2024-01-24 SDOH — ECONOMIC STABILITY: FOOD INSECURITY: WITHIN THE PAST 12 MONTHS, THE FOOD YOU BOUGHT JUST DIDN'T LAST AND YOU DIDN'T HAVE MONEY TO GET MORE.: NEVER TRUE

## 2024-01-24 SDOH — ECONOMIC STABILITY: FOOD INSECURITY: WITHIN THE PAST 12 MONTHS, YOU WORRIED THAT YOUR FOOD WOULD RUN OUT BEFORE YOU GOT MONEY TO BUY MORE.: NEVER TRUE

## 2024-01-24 SDOH — ECONOMIC STABILITY: HOUSING INSECURITY
IN THE LAST 12 MONTHS, WAS THERE A TIME WHEN YOU DID NOT HAVE A STEADY PLACE TO SLEEP OR SLEPT IN A SHELTER (INCLUDING NOW)?: NO

## 2024-01-24 SDOH — ECONOMIC STABILITY: INCOME INSECURITY: HOW HARD IS IT FOR YOU TO PAY FOR THE VERY BASICS LIKE FOOD, HOUSING, MEDICAL CARE, AND HEATING?: NOT VERY HARD

## 2024-01-24 SDOH — ECONOMIC STABILITY: TRANSPORTATION INSECURITY
IN THE PAST 12 MONTHS, HAS LACK OF TRANSPORTATION KEPT YOU FROM MEETINGS, WORK, OR FROM GETTING THINGS NEEDED FOR DAILY LIVING?: YES

## 2024-01-24 ASSESSMENT — ENCOUNTER SYMPTOMS
ALLERGIC/IMMUNOLOGIC NEGATIVE: 1
RESPIRATORY NEGATIVE: 1
EYES NEGATIVE: 1
GASTROINTESTINAL NEGATIVE: 1

## 2024-01-24 NOTE — PROGRESS NOTES
Name: Marychuy Leblanc  Date: 1/24/2024  YOB: 1990  LMP: Patient's last menstrual period was 01/01/2024.      Marychuy is a 33 y.o. G0  who is here for a annual exam.     Birth control: none, ok if gets pregnant  Menstrual status: irregular cycles  Gyn Surgery: none     Health Maintenance:  Pap Smear: last pap in 12/15/22, pathology negative  Any history of abnormal pap smear? no  HPV vaccine: not done    Review of Systems   Constitutional: Negative.    HENT: Negative.     Eyes: Negative.    Respiratory: Negative.     Cardiovascular: Negative.    Gastrointestinal: Negative.    Endocrine: Negative.    Genitourinary: Negative.    Musculoskeletal: Negative.    Skin: Negative.    Allergic/Immunologic: Negative.    Hematological: Negative.    Psychiatric/Behavioral: Negative.  Negative for self-injury and suicidal ideas.         Anxiety        Past Medical History:  No date: Depression  No date: Fatigue  No date: Genital warts  No date: H/O chlamydia infection  No date: History of PCOS  No date: Hot flashes  No date: PCOS (polycystic ovarian syndrome)  No date: Tinnitus     Past Surgical History:  No date: WISDOM TOOTH EXTRACTION       Current Outpatient Medications:     NIFEdipine (PROCARDIA XL) 30 MG extended release tablet, Take 1 tablet by mouth daily, Disp: , Rfl:     Family Cancer History:   Cancer-related family history includes Cancer in her maternal grandmother; Colon Cancer in her father and paternal aunt; Prostate Cancer in her father.     Social History:  reports that she has quit smoking. She has never used smokeless tobacco. She reports that she does not currently use alcohol. She reports that she does not use drugs.    Ob History:  No obstetric history on file.     Sexual History:  reports being sexually active and has had partner(s) who are male. She reports using the following method of birth control/protection: None.    PHYSICAL EXAM:  Vitals:  vitals were not taken for this visit.  There is

## 2024-01-30 LAB
COLLECTION METHOD: NORMAL
CYTOLOGIST CVX/VAG CYTO: NORMAL
CYTOLOGY CVX/VAG DOC THIN PREP: NORMAL
DATE OF LMP: NORMAL
HPV APTIMA: NEGATIVE
HPV GENOTYPE REFLEX: NORMAL
Lab: NORMAL
OTHER PT INFO: NORMAL
PAP SOURCE: NORMAL
PATH REPORT.FINAL DX SPEC: NORMAL
PREV CYTO INFO: NEGATIVE
PREV TREATMENT RESULTS: NORMAL
PREV TREATMENT: NORMAL
STAT OF ADQ CVX/VAG CYTO-IMP: NORMAL

## 2024-02-07 ENCOUNTER — OFFICE VISIT (OUTPATIENT)
Age: 34
End: 2024-02-07

## 2024-02-07 VITALS
HEART RATE: 83 BPM | RESPIRATION RATE: 16 BRPM | OXYGEN SATURATION: 99 % | DIASTOLIC BLOOD PRESSURE: 84 MMHG | TEMPERATURE: 98.1 F | SYSTOLIC BLOOD PRESSURE: 132 MMHG

## 2024-02-07 DIAGNOSIS — J06.9 VIRAL UPPER RESPIRATORY TRACT INFECTION: Primary | ICD-10-CM

## 2024-02-07 PROBLEM — M75.22 BICEPS TENDINITIS OF LEFT UPPER EXTREMITY: Status: RESOLVED | Noted: 2018-03-27 | Resolved: 2024-02-07

## 2024-02-07 LAB
GROUP A STREP ANTIGEN, POC: NEGATIVE
INFLUENZA A ANTIGEN, POC: NEGATIVE
INFLUENZA B ANTIGEN, POC: NEGATIVE
SARS-COV-2 RNA, POC: NEGATIVE
VALID INTERNAL CONTROL, POC: YES

## 2024-02-07 ASSESSMENT — ENCOUNTER SYMPTOMS
COUGH: 0
SWOLLEN GLANDS: 0
SORE THROAT: 1

## 2024-02-07 NOTE — PATIENT INSTRUCTIONS
Common colds and viral infections typically clear up in seven to 10 days.     The following supportive care can usually help you feel better:  Get plenty of rest, eat nutritiously, and maintain adequate hydration to support your immune system.   Over the counter Flonase (fluticasone) 50mcg nasal spray, 2 sprays each nostril daily to help with nasal congestion and postnasal drip.  Saline nasal spray to relieve nasal congestion and promote nasal drainage. This will hydrate your sinuses as well.   Warm saltwater gargles to alleviate throat discomfort.   Ibuprofen 400mg every 8 hours as needed with food (max 3200mg/24 hours) for pain.   Tylenol (acetaminophen) 325mg to 1000mg every 8hours as needed (max 3000mg/24 hours) for pain.  Room humidifier or vaporizer: relief of nasal congestion, cough, and sore throat.  Honey: cough relief. Improves cough and sleep in children more than 12 months old and potentially soothes sore throat (avoid in children under one year due to risk of botulism).    There is a possibility of a false negative result when using rapid (antigen) COVID-19 tests, especially if they are taken early after the onset of symptoms. Retesting at home on days 3 and then 5 of symptoms is recommended if symptoms continue.    Seek emergent medical attention for trouble breathing, persistent pain or pressure in the chest, new confusion, inability to wake or stay awake, bluish lips or face, or for any other concerning symptoms.

## 2024-02-07 NOTE — PROGRESS NOTES
of symptoms. Retesting at home on days 3 and then 5 of symptoms is recommended if symptoms continue.    Seek emergent medical attention for trouble breathing, persistent pain or pressure in the chest, new confusion, inability to wake or stay awake, bluish lips or face, or for any other concerning symptoms.       Aditi Coffman, APRN - CNP

## 2024-04-17 ENCOUNTER — OFFICE VISIT (OUTPATIENT)
Age: 34
End: 2024-04-17

## 2024-04-17 VITALS
HEART RATE: 98 BPM | SYSTOLIC BLOOD PRESSURE: 128 MMHG | RESPIRATION RATE: 16 BRPM | OXYGEN SATURATION: 98 % | DIASTOLIC BLOOD PRESSURE: 80 MMHG

## 2024-04-17 DIAGNOSIS — Z01.30 BP CHECK: Primary | ICD-10-CM

## 2024-04-17 ASSESSMENT — ENCOUNTER SYMPTOMS
SHORTNESS OF BREATH: 0
NAUSEA: 0
VOMITING: 0
PHOTOPHOBIA: 0

## 2024-04-17 NOTE — PROGRESS NOTES
PROGRESS NOTE    SUBJECTIVE:   Marychuy Leblanc is a 34 y.o. female seen for blood pressure check after developing a headache and some dizziness after eating lunch.      BLOOD PRESSURE CHECK      Current Outpatient Medications   Medication Sig Dispense Refill    NIFEdipine (PROCARDIA XL) 30 MG extended release tablet Take 1 tablet by mouth daily       No current facility-administered medications for this visit.      No Known Allergies    Social History     Tobacco Use    Smoking status: Former    Smokeless tobacco: Never   Vaping Use    Vaping Use: Never used   Substance Use Topics    Alcohol use: Not Currently    Drug use: No        Review of Systems   Constitutional:  Negative for diaphoresis and fatigue.   Eyes:  Negative for photophobia and visual disturbance.   Respiratory:  Negative for shortness of breath.    Cardiovascular:  Negative for chest pain and palpitations.   Gastrointestinal:  Negative for nausea and vomiting.   Musculoskeletal:  Negative for gait problem and neck pain.   Skin:  Negative for pallor.   Neurological:  Negative for dizziness, syncope, facial asymmetry, speech difficulty, weakness, light-headedness, numbness and headaches.   Psychiatric/Behavioral:  Negative for confusion.           OBJECTIVE:  /80 (Site: Left Upper Arm, Position: Sitting, Cuff Size: Large Adult)   Pulse 98   Resp 16   SpO2 98%      Physical Exam  Vitals reviewed.   Constitutional:       General: She is awake. She is not in acute distress.     Appearance: Normal appearance. She is well-developed and well-groomed.   Eyes:      Pupils: Pupils are equal, round, and reactive to light. Pupils are equal.      Right eye: Pupil is not sluggish.      Left eye: Pupil is not sluggish.   Cardiovascular:      Rate and Rhythm: Normal rate and regular rhythm.      Heart sounds: Normal heart sounds.   Pulmonary:      Effort: Pulmonary effort is normal.   Neurological:      Mental Status: She is alert and oriented to person,

## 2024-10-11 NOTE — PROGRESS NOTES
PROGRESS NOTE    SUBJECTIVE:   Marychuy Leblanc is a 34 y.o. female seen in the employer based health center located at [facility] for employment physical for annual employer Health Risk Assessment and biometric screening as required to obtain their insurance premium discounts.  No complaints or concerns at this time.     Chief Complaint    Health Assessment           OBJECTIVE:  /80 (Site: Left Upper Arm, Position: Sitting, Cuff Size: Large Adult)   Pulse 97   Ht 1.6 m (5' 3\")   Wt 92.5 kg (204 lb)   BMI 36.14 kg/m²    Additional Measurements    10/15/24 0820   Waist (Inches): 39.75 in        Physical Exam  Vitals reviewed.   Constitutional:       General: She is awake. She is not in acute distress.     Appearance: Normal appearance. She is well-developed and well-groomed.      Comments: Successful venipuncture of the LAC performed, pt tolerated procedure well   Cardiovascular:      Rate and Rhythm: Normal rate and regular rhythm.      Heart sounds: Normal heart sounds.   Pulmonary:      Effort: Pulmonary effort is normal.   Neurological:      Mental Status: She is alert.   Psychiatric:         Behavior: Behavior is cooperative.       ASSESSMENT and PLAN    Marychuy was seen today for health assessment.    Diagnoses and all orders for this visit:    Encounter for biometric screening  -     Glucose, Random  -     Lipid Panel W/ Chol/HDL Ratio  -     Hemoglobin A1C  -     COLLECTION VENOUS BLOOD,VENIPUNCTURE        Discussed HR notification process for credits and that I will contact with results when available    Counseled on benefits of having a primary care provider which includes, but is not limited to, continuity of care and having a medical home when concerns arise. Also enforced that onsite clinic policy states that we are not to take the place of a primary care provider, pt verbalized understanding.     I have reviewed the patient's medication list, past medical, family, social, and

## 2024-10-15 ENCOUNTER — OFFICE VISIT (OUTPATIENT)
Age: 34
End: 2024-10-15

## 2024-10-15 VITALS
WEIGHT: 204 LBS | HEART RATE: 97 BPM | SYSTOLIC BLOOD PRESSURE: 120 MMHG | HEIGHT: 63 IN | BODY MASS INDEX: 36.14 KG/M2 | DIASTOLIC BLOOD PRESSURE: 80 MMHG

## 2024-10-15 DIAGNOSIS — Z00.8 ENCOUNTER FOR BIOMETRIC SCREENING: Primary | ICD-10-CM

## 2024-10-15 PROBLEM — F32.81 PREMENSTRUAL DYSPHORIC DISORDER: Chronic | Status: ACTIVE | Noted: 2024-07-10

## 2024-10-15 PROBLEM — E88.810 METABOLIC SYNDROME: Chronic | Status: ACTIVE | Noted: 2024-07-09

## 2024-10-15 PROBLEM — G44.219 EPISODIC TENSION-TYPE HEADACHE, NOT INTRACTABLE: Status: ACTIVE | Noted: 2024-07-09

## 2024-10-15 PROBLEM — E66.812 CLASS 2 OBESITY: Status: ACTIVE | Noted: 2020-11-11

## 2024-10-15 PROBLEM — I10 HYPERTENSION, ESSENTIAL: Chronic | Status: ACTIVE | Noted: 2024-07-09

## 2024-10-15 PROBLEM — E78.5 DYSLIPIDEMIA: Status: ACTIVE | Noted: 2024-07-09

## 2024-10-16 LAB
CHOLEST SERPL-MCNC: 166 MG/DL (ref 100–199)
CHOLEST/HDLC SERPL: 3.8 RATIO (ref 0–4.4)
GLUCOSE SERPL-MCNC: 102 MG/DL (ref 70–99)
HBA1C MFR BLD: 5.2 % (ref 4.8–5.6)
HDLC SERPL-MCNC: 44 MG/DL
LDLC SERPL CALC-MCNC: 110 MG/DL (ref 0–99)
TRIGL SERPL-MCNC: 60 MG/DL (ref 0–149)
VLDLC SERPL CALC-MCNC: 12 MG/DL (ref 5–40)

## 2024-11-25 ENCOUNTER — OFFICE VISIT (OUTPATIENT)
Age: 34
End: 2024-11-25

## 2024-11-25 VITALS
DIASTOLIC BLOOD PRESSURE: 94 MMHG | TEMPERATURE: 98.2 F | RESPIRATION RATE: 16 BRPM | HEART RATE: 88 BPM | SYSTOLIC BLOOD PRESSURE: 124 MMHG | OXYGEN SATURATION: 99 %

## 2024-11-25 DIAGNOSIS — J32.9 RHINOSINUSITIS: Primary | ICD-10-CM

## 2024-11-25 LAB
GROUP A STREP ANTIGEN, POC: NEGATIVE
INFLUENZA A ANTIGEN, POC: NEGATIVE
INFLUENZA B ANTIGEN, POC: NEGATIVE
LOT EXPIRE DATE: NORMAL
LOT KIT NUMBER: NORMAL
SARS-COV-2 RNA, POC: NEGATIVE
VALID INTERNAL CONTROL, POC: YES
VALID INTERNAL CONTROL: YES
VENDOR AND KIT NAME POC: NORMAL

## 2024-11-25 ASSESSMENT — ENCOUNTER SYMPTOMS
NAUSEA: 0
RHINORRHEA: 1
EYE ITCHING: 0
SHORTNESS OF BREATH: 0
WHEEZING: 0
EYE DISCHARGE: 0
SORE THROAT: 0
CHEST TIGHTNESS: 0
SINUS PRESSURE: 0
ABDOMINAL PAIN: 0
EYE PAIN: 0
EYE REDNESS: 0
VOMITING: 0
DIARRHEA: 0
COUGH: 1
SWOLLEN GLANDS: 0
SINUS PAIN: 0

## 2024-11-25 NOTE — PROGRESS NOTES
PROGRESS NOTE    SUBJECTIVE:   Marychuy Leblanc is a 34 y.o. female seen in the employer based health center located at [facility] for cold symptoms that started 4 days ago. Denies any known or suspected sick contacts    Chief Complaint    Cold Symptoms           History provided by:  Patient   used: No    Cold Symptoms   This is a new problem. Episode onset: 4 days. The problem has been unchanged. There has been no fever. Associated symptoms include congestion, coughing, rhinorrhea and sneezing. Pertinent negatives include no abdominal pain, chest pain, diarrhea, dysuria, ear pain, headaches, joint pain, joint swelling, nausea, neck pain, plugged ear sensation, rash, sinus pain, sore throat, swollen glands, vomiting or wheezing. She has tried NSAIDs (nyquil) for the symptoms. The treatment provided mild relief.       Current Outpatient Medications   Medication Sig Dispense Refill    amoxicillin-clavulanate (AUGMENTIN) 875-125 MG per tablet Take 1 tablet by mouth 2 times daily for 5 days 10 tablet 0    metFORMIN (GLUCOPHAGE) 500 MG tablet Take 1 tablet by mouth daily      sertraline (ZOLOFT) 50 MG tablet Take 1 tablet by mouth daily      NIFEdipine (PROCARDIA XL) 30 MG extended release tablet Take 1 tablet by mouth daily       No current facility-administered medications for this visit.      No Known Allergies    Social History     Tobacco Use    Smoking status: Former    Smokeless tobacco: Never   Vaping Use    Vaping status: Never Used   Substance Use Topics    Alcohol use: Not Currently    Drug use: No        Review of Systems   Constitutional:  Positive for fatigue. Negative for chills and fever.   HENT:  Positive for congestion, postnasal drip, rhinorrhea and sneezing. Negative for ear pain, sinus pressure, sinus pain and sore throat.    Eyes:  Negative for pain, discharge, redness and itching.   Respiratory:  Positive for cough. Negative for chest tightness, shortness of

## 2024-12-26 ENCOUNTER — OFFICE VISIT (OUTPATIENT)
Age: 34
End: 2024-12-26

## 2024-12-26 VITALS
HEART RATE: 67 BPM | SYSTOLIC BLOOD PRESSURE: 134 MMHG | TEMPERATURE: 98.3 F | OXYGEN SATURATION: 98 % | RESPIRATION RATE: 16 BRPM | DIASTOLIC BLOOD PRESSURE: 82 MMHG

## 2024-12-26 DIAGNOSIS — L03.115 CELLULITIS OF RIGHT LOWER LIMB: Primary | ICD-10-CM

## 2024-12-26 RX ORDER — SULFAMETHOXAZOLE AND TRIMETHOPRIM 800; 160 MG/1; MG/1
1 TABLET ORAL 2 TIMES DAILY
Qty: 14 TABLET | Refills: 0 | Status: SHIPPED | OUTPATIENT
Start: 2024-12-26 | End: 2025-01-02

## 2024-12-26 ASSESSMENT — ENCOUNTER SYMPTOMS
EYE PAIN: 0
NAIL CHANGES: 0
VOMITING: 0
DIARRHEA: 0
COUGH: 0
SORE THROAT: 0
COLOR CHANGE: 1
RHINORRHEA: 0
SHORTNESS OF BREATH: 0

## 2024-12-26 NOTE — PROGRESS NOTES
PROGRESS NOTE    SUBJECTIVE:   Marychuy Leblanc is a 34 y.o. female [facility] in the [facility] for possible infected spider bites to her right lower leg for past 2 days. Denies seeing the wound inflicting insect    Chief Complaint    Skin Problem           History provided by:  Patient   used: No    Skin Problem  This is a new problem. Episode onset: 2 days. The problem has been gradually worsening since onset. The affected locations include the right lower leg. The rash is characterized by redness, swelling and blistering. It is unknown if there was an exposure to a precipitant. Pertinent negatives include no anorexia, congestion, cough, diarrhea, eye pain, facial edema, fatigue, fever, joint pain, nail changes, rhinorrhea, shortness of breath, sore throat or vomiting. Past treatments include nothing.       Current Outpatient Medications   Medication Sig Dispense Refill    sulfamethoxazole-trimethoprim (BACTRIM DS;SEPTRA DS) 800-160 MG per tablet Take 1 tablet by mouth 2 times daily for 7 days 14 tablet 0    metFORMIN (GLUCOPHAGE) 500 MG tablet Take 1 tablet by mouth daily       No current facility-administered medications for this visit.      No Known Allergies    Social History     Tobacco Use    Smoking status: Former    Smokeless tobacco: Never   Vaping Use    Vaping status: Never Used   Substance Use Topics    Alcohol use: Not Currently    Drug use: No        Review of Systems   Constitutional:  Negative for chills, fatigue and fever.   HENT:  Negative for congestion, rhinorrhea and sore throat.    Eyes:  Negative for pain.   Respiratory:  Negative for cough and shortness of breath.    Cardiovascular:  Negative for chest pain.   Gastrointestinal:  Negative for anorexia, diarrhea and vomiting.   Musculoskeletal:  Negative for arthralgias, joint pain and myalgias.   Skin:  Positive for color change and wound. Negative for nail changes.

## 2025-01-29 ENCOUNTER — OFFICE VISIT (OUTPATIENT)
Dept: OBGYN CLINIC | Age: 35
End: 2025-01-29
Payer: COMMERCIAL

## 2025-01-29 VITALS
WEIGHT: 209 LBS | DIASTOLIC BLOOD PRESSURE: 80 MMHG | SYSTOLIC BLOOD PRESSURE: 130 MMHG | BODY MASS INDEX: 37.03 KG/M2 | HEIGHT: 63 IN

## 2025-01-29 DIAGNOSIS — Z11.51 ENCOUNTER FOR SCREENING FOR HUMAN PAPILLOMAVIRUS (HPV): ICD-10-CM

## 2025-01-29 DIAGNOSIS — Z12.4 SCREENING FOR CERVICAL CANCER: ICD-10-CM

## 2025-01-29 DIAGNOSIS — Z01.419 WELL WOMAN EXAM WITH ROUTINE GYNECOLOGICAL EXAM: Primary | ICD-10-CM

## 2025-01-29 DIAGNOSIS — Z31.9 DESIRE FOR PREGNANCY: ICD-10-CM

## 2025-01-29 PROCEDURE — 3075F SYST BP GE 130 - 139MM HG: CPT | Performed by: OBSTETRICS & GYNECOLOGY

## 2025-01-29 PROCEDURE — 3079F DIAST BP 80-89 MM HG: CPT | Performed by: OBSTETRICS & GYNECOLOGY

## 2025-01-29 PROCEDURE — 99459 PELVIC EXAMINATION: CPT | Performed by: OBSTETRICS & GYNECOLOGY

## 2025-01-29 PROCEDURE — 99395 PREV VISIT EST AGE 18-39: CPT | Performed by: OBSTETRICS & GYNECOLOGY

## 2025-01-29 RX ORDER — CLOTRIMAZOLE AND BETAMETHASONE DIPROPIONATE 10; .64 MG/G; MG/G
CREAM TOPICAL
Qty: 45 G | Refills: 1 | Status: SHIPPED | OUTPATIENT
Start: 2025-01-29

## 2025-01-29 ASSESSMENT — ENCOUNTER SYMPTOMS
GASTROINTESTINAL NEGATIVE: 1
RESPIRATORY NEGATIVE: 1
EYES NEGATIVE: 1
ALLERGIC/IMMUNOLOGIC NEGATIVE: 1

## 2025-01-29 NOTE — PROGRESS NOTES
Name: Marychuy Leblanc  Date: 2025  YOB: 1990  LMP: No LMP recorded.      Marychuy is a 34 y.o.   who is here for a annual exam.  While she knows her life long partner has a very low sperm count she still wants to be preg.     Marychuy  reports being sexually active and has had partner(s) who are male. She reports using the following method of birth control/protection: None.  Contraception: none.   Menstrual status: regular cycles  Gyn Surgery: none     Women's Health Timeline:  No specialty comments available.      Pap History:  Diagnosis:   Date Value Ref Range Status   2024 Comment   Final     Comment:     (NOTE)  NEGATIVE FOR INTRAEPITHELIAL LESION OR MALIGNANCY.     12/15/2022 Comment   Final     Comment:     (NOTE)  NEGATIVE FOR INTRAEPITHELIAL LESION OR MALIGNANCY.  THIS SPECIMEN WAS RESCREENED AS PART OF OUR  PROGRAM.     2021 Comment  Final     Comment:     NEGATIVE FOR INTRAEPITHELIAL LESION OR MALIGNANCY.     HPV Aptima   Date Value Ref Range Status   2024 Negative Negative   Final     Comment:     (NOTE)  This nucleic acid amplification test detects fourteen high-risk  HPV types (16,18,31,33,35,39,45,51,52,56,58,59,66,68) without  differentiation.       HPV Genotype Reflex   Date Value Ref Range Status   2024 Comment   Final     Comment:     (NOTE)  Criteria not met, HPV Genotype not performed.  No. of containers..01 ThinPrep Vial  Performed At: [facility] [id number]  Endy Wray MD Ph:[phone]  [facility]  [address] [place] [id number]  Endy Wray MD Ph:[phone]          OB History:  OB History          0    Para        Term   0       0    AB   0    Living   0         SAB        IAB        Ectopic        Molar        Multiple        Live Births                     Medical History:  Past Medical History:  3/27/2018: Biceps tendinitis of

## 2025-02-05 LAB
COLLECTION METHOD: ABNORMAL
CYTOLOGIST CVX/VAG CYTO: ABNORMAL
CYTOLOGY CVX/VAG DOC THIN PREP: ABNORMAL
DATE OF LMP: ABNORMAL
HPV APTIMA: POSITIVE
HPV GENOTYPE 18,45: NEGATIVE
HPV, GENOTYPE 16: NEGATIVE
Lab: ABNORMAL
Lab: ABNORMAL
OTHER PT INFO: ABNORMAL
PAP SOURCE: ABNORMAL
PATH REPORT.FINAL DX SPEC: ABNORMAL
PREV CYTO INFO: ABNORMAL
PREV TREATMENT RESULTS: ABNORMAL
PREV TREATMENT: ABNORMAL
STAT OF ADQ CVX/VAG CYTO-IMP: ABNORMAL

## 2025-02-06 ENCOUNTER — TELEPHONE (OUTPATIENT)
Dept: OBGYN CLINIC | Age: 35
End: 2025-02-06

## 2025-02-06 RX ORDER — METRONIDAZOLE 500 MG/1
2000 TABLET ORAL ONCE
Qty: 4 TABLET | Refills: 0 | Status: SHIPPED | OUTPATIENT
Start: 2025-02-06 | End: 2025-02-06

## 2025-03-12 ENCOUNTER — OFFICE VISIT (OUTPATIENT)
Dept: OBGYN CLINIC | Age: 35
End: 2025-03-12

## 2025-03-12 DIAGNOSIS — Z71.1 CONCERN ABOUT STD IN FEMALE WITHOUT DIAGNOSIS: Primary | ICD-10-CM

## 2025-03-12 SDOH — ECONOMIC STABILITY: FOOD INSECURITY: WITHIN THE PAST 12 MONTHS, YOU WORRIED THAT YOUR FOOD WOULD RUN OUT BEFORE YOU GOT MONEY TO BUY MORE.: SOMETIMES TRUE

## 2025-03-12 SDOH — ECONOMIC STABILITY: FOOD INSECURITY: WITHIN THE PAST 12 MONTHS, THE FOOD YOU BOUGHT JUST DIDN'T LAST AND YOU DIDN'T HAVE MONEY TO GET MORE.: SOMETIMES TRUE

## 2025-03-12 NOTE — PROGRESS NOTES
Name: Marychuy Leblanc  Date: 3/12/2025  YOB: 1990  LMP: No LMP recorded.      Marychuy is a 35 y.o.   who is here for  a recheck for trich.  She was dx off of a pap and not explained that it could be a false positive.  She took flagyl and her  saw his primary who said he was negative.  It caused some difficulty in their marriage.  She has had a neg adeline.     Marychuy  reports being sexually active and has had partner(s) who are male. She reports using the following method of birth control/protection: None.  Contraception: none.   Menstrual status: regular cycles  Gyn Surgery: none     Women's Health Timeline:  No specialty comments available.      Pap History:  Diagnosis:   Date Value Ref Range Status   2025 Comment   Final     Comment:     (NOTE)  NEGATIVE FOR INTRAEPITHELIAL LESION OR MALIGNANCY.  TRICHOMONAS VAGINALIS IS PRESENT.  THIS SPECIMEN WAS RESCREENED AS PART OF OUR  PROGRAM.     2024 Comment   Final     Comment:     (NOTE)  NEGATIVE FOR INTRAEPITHELIAL LESION OR MALIGNANCY.     12/15/2022 Comment   Final     Comment:     (NOTE)  NEGATIVE FOR INTRAEPITHELIAL LESION OR MALIGNANCY.  THIS SPECIMEN WAS RESCREENED AS PART OF OUR  PROGRAM.       HPV Aptima   Date Value Ref Range Status   2025 Positive (A) Negative   Final     Comment:     (NOTE)  This nucleic acid amplification test detects fourteen high-risk  HPV types (16,18,31,33,35,39,45,51,52,56,58,59,66,68) without  differentiation.  Performed At: [address] [id number]  Endy Wray MD Ph:[phone]  Performed At: [address]  [address] [place], [place] [id number]  Enyd Wray MD Ph:[phone]     2024 Negative Negative   Final     Comment:     (NOTE)  This nucleic acid amplification test detects fourteen high-risk  HPV types (16,18,31,33,35,39,45,51,52,56,58,59,66,68) without  differentiation.       HPV Genotype

## 2025-04-01 ENCOUNTER — OFFICE VISIT (OUTPATIENT)
Age: 35
End: 2025-04-01

## 2025-04-01 VITALS — HEART RATE: 61 BPM | DIASTOLIC BLOOD PRESSURE: 102 MMHG | OXYGEN SATURATION: 99 % | SYSTOLIC BLOOD PRESSURE: 174 MMHG

## 2025-04-01 DIAGNOSIS — Z01.30 BP CHECK: Primary | ICD-10-CM

## 2025-04-01 ASSESSMENT — ENCOUNTER SYMPTOMS
SHORTNESS OF BREATH: 0
VOMITING: 0
NAUSEA: 0
PHOTOPHOBIA: 0

## 2025-04-01 NOTE — PROGRESS NOTES
Left eye: Pupil is not sluggish.   Cardiovascular:      Rate and Rhythm: Normal rate and regular rhythm.      Heart sounds: Normal heart sounds.   Pulmonary:      Effort: Pulmonary effort is normal.   Neurological:      Mental Status: She is alert and oriented to person, place, and time.      Motor: No weakness.      Coordination: Coordination is intact. Coordination normal.      Gait: Gait is intact. Gait normal.   Psychiatric:         Behavior: Behavior is cooperative.         ASSESSMENT and PLAN    Marychuy was seen today for blood pressure check.    Diagnoses and all orders for this visit:    BP check  Comments:  Significantly elevated blood pressure reading at this time. Encouraged to call her PCP office to discuss restarting her blood pressure medication daily    Continue to track BP readings at home and when out in the grocerystore/pharmacy and log readings to monitor for trends, pt only to check this once a day unless develops worrisome symptoms.   Watch salt intake, utilize [employer] products in place of table salt, cook fresh healthy meals with lots of fruits and vegetables, drink at least 8 glass of water a day, and walk daily for 20-30 minutes    Counseled on benefits of having a primary care provider which includes, but is not limited to, continuity of care and having a medical home when concerns arise. Also enforced that onsite clinic policy states that we are not to take the place of a primary care provider, pt verbalized understanding.     SEs and risk vs benefits associated with medications prescribed discussed with patient who verbalized understanding. Pt verbalized understanding and agreement with plan of care. RTC for persisting/worsening symptoms or new complaints that arise. Discussed signs and symptoms that would warrant immediate evaluation including, but not limited to HA, blurred vision, speech disturbance, difficulty with ambulation/gait, numbness, tingling, weakness, syncope, chest

## 2025-06-24 ENCOUNTER — OFFICE VISIT (OUTPATIENT)
Age: 35
End: 2025-06-24

## 2025-06-24 VITALS
OXYGEN SATURATION: 98 % | SYSTOLIC BLOOD PRESSURE: 120 MMHG | RESPIRATION RATE: 14 BRPM | HEART RATE: 54 BPM | DIASTOLIC BLOOD PRESSURE: 76 MMHG

## 2025-06-24 DIAGNOSIS — Z01.30 BP CHECK: Primary | ICD-10-CM

## 2025-06-24 RX ORDER — NIFEDIPINE 30 MG/1
30 TABLET, EXTENDED RELEASE ORAL DAILY
COMMUNITY
Start: 2025-04-02

## 2025-06-24 ASSESSMENT — ENCOUNTER SYMPTOMS
VOMITING: 0
PHOTOPHOBIA: 0
NAUSEA: 0
SHORTNESS OF BREATH: 0

## 2025-06-24 NOTE — PROGRESS NOTES
PROGRESS NOTE    SUBJECTIVE:   Marychuy Leblanc is a 35 y.o. female seen in the [facility] located at [facility] for blood pressure check as she has had a lingering headache since yesterday. Reports she took Tylenol with no relief      BLOOD PRESSURE CHECK      Current Outpatient Medications   Medication Sig Dispense Refill    NIFEdipine (PROCARDIA XL) 30 MG extended release tablet Take 1 tablet by mouth daily       No current facility-administered medications for this visit.      No Known Allergies    Social History     Tobacco Use    Smoking status: Former    Smokeless tobacco: Never   Vaping Use    Vaping status: Never Used   Substance Use Topics    Alcohol use: Not Currently    Drug use: No        Review of Systems   Constitutional:  Negative for diaphoresis and fatigue.   Eyes:  Negative for photophobia and visual disturbance.   Respiratory:  Negative for shortness of breath.    Cardiovascular:  Negative for chest pain and palpitations.   Gastrointestinal:  Negative for nausea and vomiting.   Musculoskeletal:  Negative for gait problem and neck pain.   Skin:  Negative for pallor.   Neurological:  Positive for headaches. Negative for dizziness, syncope, facial asymmetry, speech difficulty, weakness, light-headedness and numbness.   Psychiatric/Behavioral:  Negative for confusion.           OBJECTIVE:  /76 (BP Site: Left Upper Arm, Patient Position: Sitting, BP Cuff Size: Large Adult)   Pulse 54   Resp 14   SpO2 98%      Physical Exam  Vitals reviewed.   Constitutional:       General: She is awake. She is not in acute distress.     Appearance: Normal appearance. She is well-developed and well-groomed.   Eyes:      Pupils: Pupils are equal, round, and reactive to light. Pupils are equal.      Right eye: Pupil is not sluggish.      Left eye: Pupil is not sluggish.   Cardiovascular:      Rate and Rhythm: Normal rate and regular rhythm.      Heart sounds: Normal heart